# Patient Record
Sex: MALE | Race: WHITE | Employment: UNEMPLOYED | URBAN - METROPOLITAN AREA
[De-identification: names, ages, dates, MRNs, and addresses within clinical notes are randomized per-mention and may not be internally consistent; named-entity substitution may affect disease eponyms.]

---

## 2017-03-28 ENCOUNTER — HOSPITAL ENCOUNTER (OUTPATIENT)
Age: 22
Discharge: HOME | End: 2017-03-28
Payer: COMMERCIAL

## 2017-03-28 VITALS
OXYGEN SATURATION: 98 % | HEART RATE: 104 BPM | TEMPERATURE: 100 F | SYSTOLIC BLOOD PRESSURE: 120 MMHG | DIASTOLIC BLOOD PRESSURE: 70 MMHG | WEIGHT: 140 LBS | RESPIRATION RATE: 16 BRPM

## 2017-03-28 DIAGNOSIS — J02.0 STREPTOCOCCUS PHARYNGITIS: Primary | ICD-10-CM

## 2017-03-28 DIAGNOSIS — R49.9 CHANGE IN VOICE: ICD-10-CM

## 2017-03-28 PROCEDURE — 99214 OFFICE O/P EST MOD 30 MIN: CPT

## 2017-03-28 PROCEDURE — 96372 THER/PROPH/DIAG INJ SC/IM: CPT

## 2017-03-28 RX ORDER — AMOXICILLIN 500 MG/1
500 TABLET, FILM COATED ORAL 3 TIMES DAILY
COMMUNITY

## 2017-03-28 RX ORDER — MESALAMINE 1.2 G/1
2.4 TABLET, DELAYED RELEASE ORAL 2 TIMES DAILY
COMMUNITY

## 2017-03-28 RX ORDER — METHYLPREDNISOLONE SODIUM SUCCINATE 125 MG/2ML
125 INJECTION, POWDER, LYOPHILIZED, FOR SOLUTION INTRAMUSCULAR; INTRAVENOUS ONCE
Status: COMPLETED | OUTPATIENT
Start: 2017-03-28 | End: 2017-03-28

## 2017-03-29 NOTE — ED PROVIDER NOTES
Patient Seen in: [address] [facility]    History   Patient presents with:  Sore Throat    Stated Complaint: Swollen Glands    HPI    Patient is a pleasant 43-year-old male. Patient was diagnosed with Streptococcus pharyngitis yesterday.   Macrina %   O2 Device 03/28/17 1924 None (Room air)       Current:/70 mmHg  Pulse 104  Temp(Src) 99.5 °F (37.5 °C) (Temporal)  Resp 16  Wt 63.504 kg  SpO2 98%        Physical Exam    Gen: Well appearing, well groomed, alert and aware x 3  Neck: Supple, full physical exam findings and the assessment and plan as above. Patient has no signs of respiratory distress and no signs airway obstruction, and no signs of dehydration.  Halitosis +, with large tonsils and no signs of peritonsillar or retropharyngeal absces

## 2017-03-29 NOTE — ED INITIAL ASSESSMENT (HPI)
States hx of enlarged tonsils in the past. Past 5 days worsening swelling. Saw a MD yesterday and started on amoxicillin. States they are worse today and it has affected his speech. On inspection tonsils are large and white.  States pain with swallowing and

## (undated) NOTE — LETTER
[facility] IN [place]  [address] [id number]  Dept: [phone]  Dept Fax: [phone]         March 28, 2017    Patient: Lynnea Babinski   YOB: 1995   Date of Visit: 3/28/2017       To Whom It May Concern:    Willow Tavarez

## (undated) NOTE — ED AVS SNAPSHOT
[facility] in [facility] [address] [address]    Phone:  [phone]    Fax:  [phone]           Angélica Gamble   MRN: [id number]    Department:  [facility] in [place]   Date of Visit:  3/28/2017           Diagnose Insurance plans vary and the physician(s) referred by the Immediate Care may not be covered by your plan. Please contact your insurance company to determine coverage for follow-up care and referrals. [facility]  [address] If you have been prescribed any medication(s), please fill your prescription right away and begin taking the medication(s) as directed.     If the Immediate Care Provider has read X-rays, these will be re-interpreted by a radiologist.  If there is a signifi can help with your Affordable Care Act coverage, as well as all types of Medicaid plans. To get signed up and covered, please call [phone] and ask to get set up for an insurance coverage that is in-network with [facility]